# Patient Record
Sex: MALE
[De-identification: names, ages, dates, MRNs, and addresses within clinical notes are randomized per-mention and may not be internally consistent; named-entity substitution may affect disease eponyms.]

---

## 2020-08-03 PROBLEM — Z00.00 ENCOUNTER FOR PREVENTIVE HEALTH EXAMINATION: Status: ACTIVE | Noted: 2020-08-03

## 2020-08-07 ENCOUNTER — TRANSCRIPTION ENCOUNTER (OUTPATIENT)
Age: 50
End: 2020-08-07

## 2020-08-07 ENCOUNTER — APPOINTMENT (OUTPATIENT)
Dept: ORTHOPEDIC SURGERY | Facility: CLINIC | Age: 50
End: 2020-08-07
Payer: COMMERCIAL

## 2020-08-07 VITALS — TEMPERATURE: 98.1 F

## 2020-08-07 VITALS
HEART RATE: 76 BPM | DIASTOLIC BLOOD PRESSURE: 89 MMHG | HEIGHT: 73 IN | SYSTOLIC BLOOD PRESSURE: 137 MMHG | BODY MASS INDEX: 30.48 KG/M2 | WEIGHT: 230 LBS

## 2020-08-07 DIAGNOSIS — M17.11 UNILATERAL PRIMARY OSTEOARTHRITIS, RIGHT KNEE: ICD-10-CM

## 2020-08-07 DIAGNOSIS — M17.12 UNILATERAL PRIMARY OSTEOARTHRITIS, LEFT KNEE: ICD-10-CM

## 2020-08-07 DIAGNOSIS — S83.241A OTHER TEAR OF MEDIAL MENISCUS, CURRENT INJURY, RIGHT KNEE, INITIAL ENCOUNTER: ICD-10-CM

## 2020-08-07 DIAGNOSIS — S83.242A OTHER TEAR OF MEDIAL MENISCUS, CURRENT INJURY, LEFT KNEE, INITIAL ENCOUNTER: ICD-10-CM

## 2020-08-07 PROCEDURE — 99204 OFFICE O/P NEW MOD 45 MIN: CPT

## 2020-08-07 PROCEDURE — 73562 X-RAY EXAM OF KNEE 3: CPT | Mod: 50

## 2020-08-07 NOTE — PHYSICAL EXAM
[Normal] : Gait: normal [Antalgic] : not antalgic [de-identified] : Right knee exam shows positive Wendy's, medial joint line tenderness, medial focal pain, no effusion\par Left knee exam shows mild medial joint line tenderness, no effusion [de-identified] : GENERAL APPEARANCE: Well nourished and hydrated, pleasant, alert, and oriented x 3. Appears their stated age. \par HEENT: Normocephalic, extraocular eye motion intact. Nasal septum midline. Oral cavity clear. External auditory canal clear. \par RESPIRATORY: Breath sounds clear and audible in all lobes. No wheezing, No accessory muscle use.\par CARDIOVASCULAR: No apparent abnormalities. No lower leg edema. No varicosities. Pedal pulses are palpable.\par NEUROLOGIC: Sensation is normal, no muscle weakness in the upper or lower extremities.\par DERMATOLOGIC: No apparent skin lesions, moist, warm, no rash.\par SPINE: Cervical spine appears normal and moves freely; thoracic spine appears normal and moves freely; lumbosacral spine appears normal and moves freely, normal, nontender.\par MUSCULOSKELETAL: Hands, wrists, and elbows are normal and move freely, shoulders are normal and move freely. \par Musculoskeletal: Gait: normal.  [de-identified] : 3V xray of the b/l knee done in the office today and reviewed by Dr. Valentino Lewis demonstrates mild tricompartmental osteoarthritis

## 2020-08-07 NOTE — END OF VISIT
[FreeTextEntry3] : I, Juan C Sawyer, acted solely as a scribe for Dr. Valentino Lewis on this date 08/07/2020.

## 2020-08-07 NOTE — DISCUSSION/SUMMARY
[de-identified] : 50 y/o M with mild tricompartmental osteoarthritis of the b/l knees. We reassured the pt that he has preserved joint space and neutral alignment with no obvious signs of fractures or pathological lesions. Pt will get an MRI of his right knee for further evaluation. We discussed meniscal tears in depth. We prescribed meloxicam. F/u with us after the MRI. Pt should consult Dr. Tuttle for his left shoulder pain.

## 2020-08-07 NOTE — HISTORY OF PRESENT ILLNESS
[Pain Location] : pain [5] : a current pain level of 5/10 [3] : a minimum pain level of 3/10 [___ mths] : [unfilled] month(s) ago [Constant] : ~He/She~ states the symptoms seem to be constant [Standing] : standing [Walking] : worsened by walking [6] : a maximum pain level of 6/10 [de-identified] : 50 y/o M presents with b/l knee pain that start on 4/15/2020. The right knee is more painful than his left knee. He notes weakness in his knees. Pt is not taking any medication for his pain. The pain is constant. He describes the pain as achy and dull. Lying down alleviates his pain. Standing and walking down stairs exacerbates his pain. Pt recently had right shoulder surgery in [place]. He also c/o of left shoulder pain.  [de-identified] : standing, stairs [de-identified] : lying down

## 2020-08-07 NOTE — REVIEW OF SYSTEMS
[Joint Stiffness] : joint stiffness [Joint Pain] : joint pain [Negative] : Endocrine [FreeTextEntry9] : b/l knee

## 2020-08-11 ENCOUNTER — APPOINTMENT (OUTPATIENT)
Dept: MRI IMAGING | Facility: CLINIC | Age: 50
End: 2020-08-11

## 2020-08-13 ENCOUNTER — APPOINTMENT (OUTPATIENT)
Dept: MRI IMAGING | Facility: CLINIC | Age: 50
End: 2020-08-13
Payer: COMMERCIAL

## 2020-08-13 ENCOUNTER — OUTPATIENT (OUTPATIENT)
Dept: OUTPATIENT SERVICES | Facility: HOSPITAL | Age: 50
LOS: 1 days | End: 2020-08-13

## 2020-08-13 DIAGNOSIS — S83.241A OTHER TEAR OF MEDIAL MENISCUS, CURRENT INJURY, RIGHT KNEE, INITIAL ENCOUNTER: ICD-10-CM

## 2020-08-13 PROCEDURE — 73721 MRI JNT OF LWR EXTRE W/O DYE: CPT | Mod: 26,RT

## 2020-08-20 ENCOUNTER — TRANSCRIPTION ENCOUNTER (OUTPATIENT)
Age: 50
End: 2020-08-20

## 2020-08-31 ENCOUNTER — TRANSCRIPTION ENCOUNTER (OUTPATIENT)
Age: 50
End: 2020-08-31

## 2021-08-04 ENCOUNTER — TRANSCRIPTION ENCOUNTER (OUTPATIENT)
Age: 51
End: 2021-08-04

## 2021-08-04 RX ORDER — MELOXICAM 15 MG/1
15 TABLET ORAL DAILY
Qty: 30 | Refills: 1 | Status: ACTIVE | COMMUNITY
Start: 2020-08-07 | End: 1900-01-01